# Patient Record
Sex: MALE | Race: WHITE | NOT HISPANIC OR LATINO | Employment: UNEMPLOYED | URBAN - METROPOLITAN AREA
[De-identification: names, ages, dates, MRNs, and addresses within clinical notes are randomized per-mention and may not be internally consistent; named-entity substitution may affect disease eponyms.]

---

## 2018-10-28 ENCOUNTER — HOSPITAL ENCOUNTER (EMERGENCY)
Facility: MEDICAL CENTER | Age: 23
End: 2018-10-28
Attending: EMERGENCY MEDICINE

## 2018-10-28 VITALS
WEIGHT: 176.37 LBS | SYSTOLIC BLOOD PRESSURE: 125 MMHG | HEIGHT: 70 IN | HEART RATE: 68 BPM | RESPIRATION RATE: 16 BRPM | DIASTOLIC BLOOD PRESSURE: 69 MMHG | BODY MASS INDEX: 25.25 KG/M2 | OXYGEN SATURATION: 97 % | TEMPERATURE: 98.7 F

## 2018-10-28 DIAGNOSIS — S61.210A LACERATION OF RIGHT INDEX FINGER WITHOUT FOREIGN BODY WITHOUT DAMAGE TO NAIL, INITIAL ENCOUNTER: ICD-10-CM

## 2018-10-28 PROCEDURE — 304217 HCHG IRRIGATION SYSTEM

## 2018-10-28 PROCEDURE — 99283 EMERGENCY DEPT VISIT LOW MDM: CPT

## 2018-10-28 ASSESSMENT — LIFESTYLE VARIABLES: DO YOU DRINK ALCOHOL: NO

## 2018-10-28 ASSESSMENT — PAIN SCALES - GENERAL: PAINLEVEL_OUTOF10: 3

## 2018-10-28 NOTE — ED PROVIDER NOTES
"ED Provider Note  CHIEF COMPLAINT  Chief Complaint   Patient presents with   • Laceration     L index finger, cut with metal saw       HPI  Barak Jeronimo is a 23 y.o. male who presents evaluation of a laceration to his left index finger.  He cut it on a metal saw over 24 hours ago.  It is at the first index finger joint or PIP joint.  No other injury.    REVIEW OF SYSTEMS  See HPI for further details.  Otherwise healthy with no other complaint.  PAST MEDICAL HISTORY  Negative    FAMILY HISTORY  Negative    SOCIAL HISTORY  Negative    ALLERGIES  No Known Allergies    PHYSICAL EXAM  VITAL SIGNS: /94   Pulse 70   Temp 36.3 °C (97.3 °F)   Resp 16   Ht 1.778 m (5' 10\")   Wt 80 kg (176 lb 5.9 oz)   SpO2 97%   BMI 25.31 kg/m²   vital signs are noted  Constitutional: Alert healthy-appearing adult in no distress   HENT: Normocephalic   Cardiovascular: Regular rhythm   Thorax & Lungs: No respiratory distress er  Skin: Warm, Dry, No rash.   Musculoskeletal: Right index finger shows a laceration at the dorsal PIP joint.  Is a 24-hour plus old wound.  No sign of infection at this time.  Good strong flexion extension of the MP and PIP and DIP joints.  Neurologic: Alert  Normal motor function,  No obvious focal deficits noted.   Psychiatric: Affect normal, and mood normal.       COURSE & MEDICAL DECISION MAKING  Patient has a laceration over the dorsal PIP joint of the right index finger.'s over 24 hours old.  Certainly should not be closed at this point.  When she did well with just bandaging and wound care.  Wound was cleaned and irrigated thoroughly.  A bandage has been applied.  Instructions on wound care been given.    FINAL IMPRESSION  1.  24+ hour finger laceration.  2.  No tendon deficit.         Electronically signed by: Gary Gansert, 10/28/2018 3:46 PM      "

## 2018-10-28 NOTE — ED TRIAGE NOTES
"Chief Complaint   Patient presents with   • Laceration     L index finger, cut with metal saw     Bleeding controlled, finger wrapped. Not UTD on tetanus.    /94   Pulse 70   Temp 36.3 °C (97.3 °F)   Resp 16   Ht 1.778 m (5' 10\")   Wt 80 kg (176 lb 5.9 oz)   SpO2 97%   BMI 25.31 kg/m²     Pt Informed regarding triage process and verbalized understanding to inform triage tech or RN for any changes in condition.  Placed in lobby.    "

## 2021-02-10 NOTE — ED NOTES
Pt ambulated to purple 73.  Agree with triage note.  Incident happened yesterday.  Sensation intact.  ERP to see.   [Normal] : normal rate, regular rhythm, normal S1 and S2 and no murmur heard [Soft] : abdomen soft [Non Tender] : non-tender [Non-distended] : non-distended [No Masses] : no abdominal mass palpated [No HSM] : no HSM [No CVA Tenderness] : no CVA  tenderness [de-identified] : hypoactive bowel sounds